# Patient Record
Sex: MALE | Race: WHITE | Employment: OTHER | URBAN - METROPOLITAN AREA
[De-identification: names, ages, dates, MRNs, and addresses within clinical notes are randomized per-mention and may not be internally consistent; named-entity substitution may affect disease eponyms.]

---

## 2020-01-26 ENCOUNTER — OFFICE VISIT (OUTPATIENT)
Dept: URGENT CARE | Facility: URGENT CARE | Age: 70
End: 2020-01-26
Payer: MEDICARE

## 2020-01-26 ENCOUNTER — ANCILLARY PROCEDURE (OUTPATIENT)
Dept: GENERAL RADIOLOGY | Facility: CLINIC | Age: 70
End: 2020-01-26
Attending: NURSE PRACTITIONER
Payer: MEDICARE

## 2020-01-26 VITALS
DIASTOLIC BLOOD PRESSURE: 82 MMHG | SYSTOLIC BLOOD PRESSURE: 144 MMHG | HEIGHT: 69 IN | RESPIRATION RATE: 20 BRPM | TEMPERATURE: 96.9 F | HEART RATE: 96 BPM | BODY MASS INDEX: 17.3 KG/M2 | OXYGEN SATURATION: 98 % | WEIGHT: 116.8 LBS

## 2020-01-26 DIAGNOSIS — R06.02 SOB (SHORTNESS OF BREATH): Primary | ICD-10-CM

## 2020-01-26 DIAGNOSIS — R68.89 INFLUENZA-LIKE SYMPTOMS: ICD-10-CM

## 2020-01-26 DIAGNOSIS — R06.02 SOB (SHORTNESS OF BREATH): ICD-10-CM

## 2020-01-26 LAB
FLUAV+FLUBV AG SPEC QL: NEGATIVE
FLUAV+FLUBV AG SPEC QL: NEGATIVE
SPECIMEN SOURCE: NORMAL

## 2020-01-26 PROCEDURE — 71046 X-RAY EXAM CHEST 2 VIEWS: CPT

## 2020-01-26 PROCEDURE — 87804 INFLUENZA ASSAY W/OPTIC: CPT | Performed by: NURSE PRACTITIONER

## 2020-01-26 PROCEDURE — 99203 OFFICE O/P NEW LOW 30 MIN: CPT | Mod: 25 | Performed by: NURSE PRACTITIONER

## 2020-01-26 RX ORDER — ASPIRIN 325 MG
325 TABLET ORAL
COMMUNITY

## 2020-01-26 RX ORDER — IPRATROPIUM BROMIDE AND ALBUTEROL SULFATE 2.5; .5 MG/3ML; MG/3ML
3 SOLUTION RESPIRATORY (INHALATION) ONCE
Status: COMPLETED | OUTPATIENT
Start: 2020-01-26 | End: 2020-01-26

## 2020-01-26 RX ADMIN — IPRATROPIUM BROMIDE AND ALBUTEROL SULFATE 3 ML: 2.5; .5 SOLUTION RESPIRATORY (INHALATION) at 17:28

## 2020-01-26 ASSESSMENT — ENCOUNTER SYMPTOMS
WHEEZING: 1
FATIGUE: 1
VOMITING: 0
SHORTNESS OF BREATH: 1
ACTIVITY CHANGE: 1
MYALGIAS: 1
APPETITE CHANGE: 1
COUGH: 1
CHILLS: 1
FEVER: 1
HEADACHES: 1
DIARRHEA: 0

## 2020-01-26 ASSESSMENT — MIFFLIN-ST. JEOR: SCORE: 1275.18

## 2020-01-26 NOTE — PROGRESS NOTES
"SUBJECTIVE:   Sudhakar Sahni is a 69 year old male presenting with a chief complaint of   Chief Complaint   Patient presents with     Fever     3 days fever, deep cough, body aches, chills and sweats, breathing is rattley, headache. Taking [place] cold and flu.     Cough       He is a new patient of [facility].    URI Adult    Onset of symptoms was 3 days ago.  Course of illness is worsening  Current and Associated symptoms: as noted above and ROS  Treatment measures tried include OTC Cough med.  Predisposing factors include HX of COPD and current smoker    Review of Systems   Constitutional: Positive for activity change, appetite change, chills, fatigue and fever.   Respiratory: Positive for cough, shortness of breath and wheezing.    Gastrointestinal: Negative for diarrhea and vomiting.   Musculoskeletal: Positive for myalgias.   Neurological: Positive for headaches.       Past Medical History:   Diagnosis Date     Circulatory system disorder     12/2012,right leg     Essential hypertension     No Comments Provided     Other specified chronic obstructive airways disease     No Comments Provided     Tobacco use disorder     No Comments Provided     Family History   Problem Relation Age of Onset     Colon Cancer Father 81        Cancer-colon     Diabetes Mother         Diabetes     Current Outpatient Medications   Medication Sig Dispense Refill     aspirin (ASA) 325 MG tablet Take 325 mg by mouth       Social History     Tobacco Use     Smoking status: Current Every Day Smoker     Packs/day: 1.00     Years: 50.00     Pack years: 50.00     Types: Cigarettes     Smokeless tobacco: Never Used   Substance Use Topics     Alcohol use: Yes     Comment: Alcoholic Drinks/day: Peppermint Schnapps in coffee. 1 per day.       OBJECTIVE  BP (!) 144/82 (BP Location: Right arm, Patient Position: Sitting, Cuff Size: Adult Regular)   Pulse 96   Temp 96.9  F (36.1  C) (Tympanic)   Resp 20   Ht 1.753 m (5' 9\")   Wt 53 kg (116 " lb 12.8 oz)   SpO2 98%   BMI 17.25 kg/m      Physical Exam  Constitutional:       Appearance: He is ill-appearing. He is not diaphoretic.   HENT:      Head: Normocephalic.      Right Ear: Tympanic membrane, ear canal and external ear normal. There is no impacted cerumen.      Left Ear: Tympanic membrane, ear canal and external ear normal. There is no impacted cerumen.      Nose: Congestion and rhinorrhea present.      Mouth/Throat:      Mouth: Mucous membranes are dry.      Pharynx: Posterior oropharyngeal erythema present.   Neck:      Musculoskeletal: Neck supple.   Cardiovascular:      Rate and Rhythm: Normal rate and regular rhythm.      Pulses: Normal pulses.      Heart sounds: Normal heart sounds. No murmur. No friction rub. No gallop.    Pulmonary:      Effort: Pulmonary effort is normal. No respiratory distress.      Breath sounds: No stridor. Wheezing and rhonchi present. No rales.      Comments: Diffuse wheezes, scattered rhonchi and diminished lower bases    POST NEB: No improvment of aeration, continued wheeze and rhonchi  Chest:      Chest wall: No tenderness.   Lymphadenopathy:      Cervical: No cervical adenopathy.   Skin:     General: Skin is warm.      Capillary Refill: Capillary refill takes less than 2 seconds.      Findings: No rash.   Neurological:      General: No focal deficit present.      Mental Status: He is alert and oriented to person, place, and time. Mental status is at baseline.   Psychiatric:         Mood and Affect: Mood normal.         Behavior: Behavior normal.         Labs:  Results for orders placed or performed in visit on 01/26/20 (from the past 24 hour(s))   Influenza A/B antigen   Result Value Ref Range    Influenza A/B Agn Specimen Nasopharyngeal     Influenza A Negative NEG^Negative    Influenza B Negative NEG^Negative   CXR:                                                                  IMPRESSION: PA and lateral views of the chest. Lungs are hyperinflated  consistent  with underlying COPD. No focal infiltrate or consolidation.  Heart is normal in size. No effusions are evident. No pneumothorax.      ASSESSMENT:    ICD-10-CM    1. SOB (shortness of breath) R06.02 ipratropium - albuterol 0.5 mg/2.5 mg/3 mL (DUONEB) neb solution 3 mL     XR Chest 2 Views     INHALATION/NEBULIZER TREATMENT, INITIAL   2. Influenza-like symptoms R68.89 Influenza A/B antigen        Medical Decision Making:    Differential Diagnosis:  URI Adult/Peds:  Bronchitis-viral, Influenza, Viral syndrome and Viral upper respiratory illness    Serious Comorbid Conditions:  Adult:  COPD and HTN    PLAN: Discussed with patient concerns of continued respiratory distress in the setting of COPD, with no improvement. Proceed to ER for further evaluation now. Declined medical transport. Will proceed to ER in [place] for further evaluation.     APRIL Khan, CNP    There are no Patient Instructions on file for this visit.

## 2020-01-26 NOTE — NURSING NOTE
Clinic Administered Medication Documentation    MEDICATION LIST: Inhalable/Nebs Medication Documentation    Patient was given Ipratropium-Albuterol Neb. Prior to medication administration, verified patients identity using patient s name and date of birth. Please see MAR and medication order for additional information.   E:6/21  [facility]

## 2022-12-01 ENCOUNTER — LAB REQUISITION (OUTPATIENT)
Dept: LAB | Facility: CLINIC | Age: 72
End: 2022-12-01

## 2022-12-01 DIAGNOSIS — A41.9 SEPSIS, UNSPECIFIED ORGANISM (H): ICD-10-CM

## 2022-12-02 LAB
CREAT SERPL-MCNC: 0.72 MG/DL (ref 0.66–1.25)
ERYTHROCYTE [DISTWIDTH] IN BLOOD BY AUTOMATED COUNT: 24.8 % (ref 10–15)
GFR SERPL CREATININE-BSD FRML MDRD: >90 ML/MIN/1.73M2
HCT VFR BLD AUTO: 29.7 % (ref 40–53)
HGB BLD-MCNC: 8.2 G/DL (ref 13.3–17.7)
MCH RBC QN AUTO: 21.1 PG (ref 26.5–33)
MCHC RBC AUTO-ENTMCNC: 27.6 G/DL (ref 31.5–36.5)
MCV RBC AUTO: 77 FL (ref 78–100)
PLATELET # BLD AUTO: 584 10E3/UL (ref 150–450)
POTASSIUM BLD-SCNC: 3.5 MMOL/L (ref 3.4–5.3)
RBC # BLD AUTO: 3.88 10E6/UL (ref 4.4–5.9)
WBC # BLD AUTO: 3.8 10E3/UL (ref 4–11)

## 2022-12-02 PROCEDURE — P9604 ONE-WAY ALLOW PRORATED TRIP: HCPCS | Performed by: NURSE PRACTITIONER

## 2022-12-02 PROCEDURE — 82565 ASSAY OF CREATININE: CPT | Performed by: NURSE PRACTITIONER

## 2022-12-02 PROCEDURE — 36415 COLL VENOUS BLD VENIPUNCTURE: CPT | Performed by: NURSE PRACTITIONER

## 2022-12-02 PROCEDURE — 85041 AUTOMATED RBC COUNT: CPT | Performed by: NURSE PRACTITIONER

## 2022-12-02 PROCEDURE — 84132 ASSAY OF SERUM POTASSIUM: CPT | Performed by: NURSE PRACTITIONER

## 2022-12-06 ENCOUNTER — DOCUMENTATION ONLY (OUTPATIENT)
Dept: OTHER | Facility: CLINIC | Age: 72
End: 2022-12-06

## 2025-02-27 ENCOUNTER — DOCUMENTATION ONLY (OUTPATIENT)
Dept: GERIATRICS | Facility: CLINIC | Age: 75
End: 2025-02-27
Payer: MEDICARE
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
## 2025-02-27 PROBLEM — R13.12 OROPHARYNGEAL DYSPHAGIA: Status: ACTIVE | Noted: 2024-05-29

## 2025-02-27 PROBLEM — K63.5 POLYP OF ASCENDING COLON: Status: ACTIVE | Noted: 2025-02-27

## 2025-02-27 PROBLEM — K55.069 OCCLUSION OF SUPERIOR MESENTERIC ARTERY: Status: ACTIVE | Noted: 2022-03-18

## 2025-02-27 PROBLEM — I70.229: Status: ACTIVE | Noted: 2024-02-07

## 2025-02-27 PROBLEM — R91.1 LUNG NODULE: Status: ACTIVE | Noted: 2025-02-22

## 2025-02-27 PROBLEM — I11.0 HYPERTENSIVE HEART DISEASE WITH HEART FAILURE (H): Status: ACTIVE | Noted: 2022-03-18

## 2025-02-27 PROBLEM — G25.81 RESTLESS LEGS SYNDROME: Status: ACTIVE | Noted: 2022-03-18

## 2025-02-27 PROBLEM — D72.829 LEUKOCYTOSIS: Status: ACTIVE | Noted: 2022-03-10

## 2025-02-27 PROBLEM — N17.9 AKI (ACUTE KIDNEY INJURY): Status: ACTIVE | Noted: 2025-02-22

## 2025-02-27 PROBLEM — R10.13 EPIGASTRIC ABDOMINAL PAIN: Status: ACTIVE | Noted: 2024-05-29

## 2025-02-27 PROBLEM — K44.9 HIATAL HERNIA: Status: ACTIVE | Noted: 2025-02-27

## 2025-02-27 PROBLEM — R55 SYNCOPE: Status: ACTIVE | Noted: 2022-11-24

## 2025-02-27 PROBLEM — A31.0 MAI (MYCOBACTERIUM AVIUM-INTRACELLULARE) (H): Status: ACTIVE | Noted: 2025-02-21

## 2025-02-27 PROBLEM — A41.9 SEVERE SEPSIS (H): Status: ACTIVE | Noted: 2022-11-23

## 2025-02-27 PROBLEM — I25.10 CAD IN NATIVE ARTERY: Status: ACTIVE | Noted: 2021-04-28

## 2025-02-27 PROBLEM — J96.21 ACUTE ON CHRONIC RESPIRATORY FAILURE WITH HYPOXIA (H): Status: ACTIVE | Noted: 2023-07-25

## 2025-02-27 PROBLEM — D50.9 IRON DEFICIENCY ANEMIA: Status: ACTIVE | Noted: 2022-11-28

## 2025-02-27 PROBLEM — K55.1: Status: ACTIVE | Noted: 2021-08-25

## 2025-02-27 PROBLEM — I95.9 HYPOTENSION: Status: ACTIVE | Noted: 2025-02-21

## 2025-02-27 PROBLEM — R52 UNCONTROLLED PAIN: Status: ACTIVE | Noted: 2022-03-10

## 2025-02-27 PROBLEM — R79.89 ELEVATED LFTS: Status: ACTIVE | Noted: 2025-02-21

## 2025-02-27 PROBLEM — E87.8 ELECTROLYTE ABNORMALITY: Status: ACTIVE | Noted: 2021-08-25

## 2025-02-27 PROBLEM — R73.03 PREDIABETES: Status: ACTIVE | Noted: 2022-03-12

## 2025-02-27 PROBLEM — R63.6 UNDERWEIGHT DUE TO INADEQUATE CALORIC INTAKE: Status: ACTIVE | Noted: 2021-08-26

## 2025-02-27 PROBLEM — I99.8 ANGIECTASIA: Status: ACTIVE | Noted: 2025-02-27

## 2025-02-27 PROBLEM — R09.02 HYPOXIA: Status: ACTIVE | Noted: 2022-03-11
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
## 2025-02-27 PROBLEM — M25.552 HIP PAIN, LEFT: Status: ACTIVE | Noted: 2022-03-18

## 2025-02-27 PROBLEM — R65.20 SEVERE SEPSIS (H): Status: ACTIVE | Noted: 2022-11-23

## 2025-02-27 PROBLEM — S72.002A CLOSED LEFT HIP FRACTURE (H): Status: ACTIVE | Noted: 2022-03-11

## 2025-02-27 PROBLEM — F33.9 DEPRESSION, RECURRENT: Status: ACTIVE | Noted: 2024-07-16

## 2025-02-27 PROBLEM — E43 SEVERE MALNUTRITION: Status: ACTIVE | Noted: 2021-08-26

## 2025-02-27 PROBLEM — R63.4 WEIGHT LOSS: Status: ACTIVE | Noted: 2025-02-27

## 2025-02-27 NOTE — PROGRESS NOTES
[facility] GERIATRICS  INITIAL VISIT NOTE      PRIMARY CARE PROVIDER AND CLINIC: Bob Bourne[address] [facility] [address]    [facility] Medical Record Number: [id number]  Place of Service where encounter took place: [facility] ([facility]) [[id number]]    Chief Complaint   Patient presents with    Hospital F/U     [facility] 2/22/2025 - 2/26/2025     HPI:    Sudhakar Sahni Jr. is a 74 year old (1950) male was admitted to the above facility from [facility]. Hospital stay 2/22/25 through 2/26/25 where they were admitted for NSTEMI. Now admitted to this facility for rehab, medical management, and nursing care.      History obtained from: facility chart records, facility staff, patient report, [facility] chart review, and Care Everywhere [facility] chart review.      Brief Hospital Course: PMH of CAD, PAD, COPD, current smoker, lung nodule who was admitted with NSTEMI. Underwent coronary angiogram, which showed multivessel disease. CV surgery consulted and was not a surgical candidate. JAYDE x2 placed to LAD, JAYDE x2 placed to circumflex. Started on ASA, Plavix and bisoprolol and atorvastatin increased. Had new onset Afib, converted back to NSR. Cardiology recommended outpatient Zio patch. Due to weakness, TCU recommended. When medically stable was discharged to [facility] for further rehab and medical management.       U Course: ***    CODE STATUS/ADVANCE DIRECTIVES: {CODE STATUS:993375}    ALLERGIES:  No Known Allergies    PAST MEDICAL HISTORY:   Past Medical History:   Diagnosis Date    Circulatory system disorder     12/2012,right leg    Essential hypertension     No Comments Provided    Other specified chronic obstructive airways disease     No Comments Provided    Tobacco use disorder     No Comments Provided     PAST SURGICAL HISTORY:   Past Surgical History:   Procedure Laterality Date    APPENDECTOMY OPEN      child    IR LUNG BIOPSY MEDIASTINUM RIGHT  6/12/2024    TONSILLECTOMY       child     FAMILY HISTORY:   Family History   Problem Relation Age of Onset    Colon Cancer Father 81        Cancer-colon    Diabetes Mother         Diabetes     Unable to review due to cognitive impairment***    SOCIAL HISTORY:   Patient's living condition: lives alone    MEDICATIONS  Post Discharge Medication Reconciliation Status: {ACO Med Rec (Provider):397877}.  Current Outpatient Medications   Medication Sig Dispense Refill    aspirin (ASA) 325 MG tablet Take 325 mg by mouth       ROS:  10 point ROS neg other than the symptoms noted above in the HPI.***  Unable to obtain due to cognitive impairment or aphasia  ROS    PHYSICAL EXAM:  There were no vitals taken for this visit.  Physical Exam     LABORATORY/IMAGING DATA:  Reviewed as per [facility] and/or [facility]    ASSESSMENT/PLAN:  {FGS DX INITIAL:816945}    Orders:   ***    {FGS TIME SPENT:863768}    Electronically signed by:  Afshan Green

## 2025-02-28 ENCOUNTER — TRANSITIONAL CARE UNIT VISIT (OUTPATIENT)
Dept: GERIATRICS | Facility: CLINIC | Age: 75
End: 2025-02-28
Payer: COMMERCIAL

## 2025-02-28 DIAGNOSIS — R53.81 PHYSICAL DECONDITIONING: ICD-10-CM

## 2025-02-28 DIAGNOSIS — E46 PROTEIN-CALORIE MALNUTRITION, UNSPECIFIED SEVERITY: ICD-10-CM

## 2025-02-28 DIAGNOSIS — F17.200 TOBACCO USE DISORDER: ICD-10-CM

## 2025-02-28 DIAGNOSIS — I21.4 NSTEMI (NON-ST ELEVATED MYOCARDIAL INFARCTION) (H): Primary | ICD-10-CM

## 2025-02-28 DIAGNOSIS — R91.8 PULMONARY NODULES: ICD-10-CM

## 2025-02-28 DIAGNOSIS — J44.9 CHRONIC OBSTRUCTIVE PULMONARY DISEASE, UNSPECIFIED COPD TYPE (H): ICD-10-CM

## 2025-02-28 DIAGNOSIS — I25.10 CORONARY ARTERY DISEASE INVOLVING NATIVE CORONARY ARTERY OF NATIVE HEART WITHOUT ANGINA PECTORIS: ICD-10-CM

## 2025-02-28 DIAGNOSIS — I73.9 PERIPHERAL ARTERIAL DISEASE: ICD-10-CM

## 2025-02-28 DIAGNOSIS — I10 PRIMARY HYPERTENSION: ICD-10-CM

## 2025-02-28 DIAGNOSIS — E78.5 HYPERLIPIDEMIA, UNSPECIFIED HYPERLIPIDEMIA TYPE: ICD-10-CM

## 2025-02-28 DIAGNOSIS — I48.91 ATRIAL FIBRILLATION, UNSPECIFIED TYPE (H): ICD-10-CM

## 2025-02-28 NOTE — LETTER
2/28/2025      Sudhakar Sahni Jr.  [address]  [place] [address]        [facility] GERIATRICS  INITIAL VISIT NOTE  February 27, 2025    PRIMARY CARE PROVIDER AND CLINIC: Bob Bourne [address] [facility] [address]    [facility] Medical Record Number: [id number]  Place of Service where encounter took place: [facility] ([facility] [[id number]]    Chief Complaint   Patient presents with    Hospital F/U     [facility] 2/22/2025 - 2/26/2025     HPI:    Sudhakar Sahni Jr. is a 74 year old (1950) male was admitted to the above facility from [facility]. Hospital stay 2/22/25 through 2/26/25 where they were admitted for ***. Now admitted to this facility for {FGS ADMISSION REASONS:483304}.      History obtained from: {FGS HPI:444819}.      Brief Hospital Course: ***    TCU Course: ***    CODE STATUS/ADVANCE DIRECTIVES: {CODE STATUS:816316}    ALLERGIES:  No Known Allergies    PAST MEDICAL HISTORY:   Past Medical History:   Diagnosis Date    Circulatory system disorder     12/2012,right leg    Essential hypertension     No Comments Provided    Other specified chronic obstructive airways disease     No Comments Provided    Tobacco use disorder     No Comments Provided     PAST SURGICAL HISTORY:   Past Surgical History:   Procedure Laterality Date    APPENDECTOMY OPEN      child    IR LUNG BIOPSY MEDIASTINUM RIGHT  6/12/2024    TONSILLECTOMY      child     FAMILY HISTORY:   Family History   Problem Relation Age of Onset    Colon Cancer Father 81        Cancer-colon    Diabetes Mother         Diabetes     Unable to review due to cognitive impairment***    SOCIAL HISTORY:   Patient's living condition: lives alone    MEDICATIONS  Post Discharge Medication Reconciliation Status: {ACO Med Rec (Provider):645318}.  Current Outpatient Medications   Medication Sig Dispense Refill    aspirin (ASA) 325 MG tablet Take 325 mg by mouth       ROS:  10 point ROS neg other than the symptoms noted  above in the HPI.***  Unable to obtain due to cognitive impairment or aphasia  ROS    PHYSICAL EXAM:  There were no vitals taken for this visit.  Physical Exam     LABORATORY/IMAGING DATA:  Reviewed as per [facility] and/or [facility]    ASSESSMENT/PLAN:  {FGS DX INITIAL:610207}    Orders:   ***    {FGS TIME SPENT:674513}    Electronically signed by:  Afshan Green       Sincerely,        APRIL Landers CNP    Electronically signed

## 2025-03-06 NOTE — PROGRESS NOTES
[facility] GERIATRICS  ACUTE/EPISODIC VISIT    [facility] Medical Record Number: [id number]  Place of Service where encounter took place: [facility] ([facility]) [[id number]]    Chief Complaint   Patient presents with    RECHECK     HPI:    Sudhakar Sahni Jr. is a 74 year old (1950), who is being seen today for an episodic care visit. HPI information obtained from: {FGS HPI:033704}.    Today's concern is:    ALLERGIES: No Known Allergies   MEDICATIONS:  Post Discharge Medication Reconciliation Status: {ACO Med Rec (Provider):842079}. ***    Current Outpatient Medications   Medication Sig Dispense Refill    acetaminophen (TYLENOL) 500 MG tablet Take 1,000 mg by mouth 3 times daily as needed for pain.      albuterol (PROAIR HFA/PROVENTIL HFA/VENTOLIN HFA) 108 (90 Base) MCG/ACT inhaler Inhale 2 puffs into the lungs every 4 hours as needed for shortness of breath, wheezing or cough.      albuterol (PROVENTIL) (2.5 MG/3ML) 0.083% neb solution Take 2.5 mg by nebulization every 6 hours as needed for shortness of breath, wheezing or cough.      aspirin (ASA) 81 MG chewable tablet Take 81 mg by mouth daily.      atorvastatin (LIPITOR) 80 MG tablet Take 80 mg by mouth at bedtime.      benzonatate (TESSALON) 100 MG capsule Take 200 mg by mouth 3 times daily as needed for cough.      bisoprolol (ZEBETA) 5 MG tablet Take 5 mg by mouth daily.      clopidogrel (PLAVIX) 75 MG tablet Take 75 mg by mouth every 24 hours.      ferrous gluconate (FERGON) 324 (38 Fe) MG tablet Take 324 mg by mouth daily (with breakfast).      Fluticasone-Umeclidin-Vilant (TRELEGY ELLIPTA) 100-62.5-25 MCG/ACT oral inhaler Inhale 1 puff into the lungs daily.      guaiFENesin (MUCINEX) 600 MG 12 hr tablet Take 1,200 mg by mouth 2 times daily.      multivitamin w/minerals (THERA-VIT-M) tablet Take 1 tablet by mouth daily.      nitroGLYcerin (NITROSTAT) 0.4 MG sublingual tablet Place 0.4 mg under the tongue every 5 minutes as needed for  chest pain. For chest pain place 1 tablet under the tongue every 5 minutes for 3 doses. If symptoms persist 5 minutes after 1st dose call 911.      pantoprazole (PROTONIX) 40 MG EC tablet Take 40 mg by mouth 2 times daily.      tamsulosin (FLOMAX) 0.4 MG capsule Take 0.8 mg by mouth at bedtime.       Medications reviewed:  Medications reconciled to facility chart and changes were made to reflect current medications as identified as above med list. Below are the changes that were made:   Medications stopped since last EPIC medication reconciliation:   There are no discontinued medications.    Medications started since last [facility] medication reconciliation:  No orders of the defined types were placed in this encounter.    ***    REVIEW OF SYSTEMS:  4 point ROS neg other than the symptoms noted above in the HPI.***  Unable to be obtained due to cognitive impairment or aphasia.   ROS    PHYSICAL EXAM:  There were no vitals taken for this visit.  Physical Exam    ASSESSMENT / PLAN:  {FGS DX:380189}    Orders:  ***    Electronically signed by:  Afshan Green***

## 2025-03-07 ENCOUNTER — TRANSITIONAL CARE UNIT VISIT (OUTPATIENT)
Dept: GERIATRICS | Facility: CLINIC | Age: 75
End: 2025-03-07
Payer: MEDICARE

## 2025-03-07 DIAGNOSIS — J44.9 CHRONIC OBSTRUCTIVE PULMONARY DISEASE, UNSPECIFIED COPD TYPE (H): ICD-10-CM

## 2025-03-07 DIAGNOSIS — I21.4 NSTEMI (NON-ST ELEVATED MYOCARDIAL INFARCTION) (H): Primary | ICD-10-CM

## 2025-03-07 DIAGNOSIS — E78.5 HYPERLIPIDEMIA, UNSPECIFIED HYPERLIPIDEMIA TYPE: ICD-10-CM

## 2025-03-07 DIAGNOSIS — I48.91 ATRIAL FIBRILLATION, UNSPECIFIED TYPE (H): ICD-10-CM

## 2025-03-07 DIAGNOSIS — I73.9 PERIPHERAL ARTERIAL DISEASE: ICD-10-CM

## 2025-03-07 DIAGNOSIS — I25.10 CORONARY ARTERY DISEASE INVOLVING NATIVE CORONARY ARTERY OF NATIVE HEART WITHOUT ANGINA PECTORIS: ICD-10-CM

## 2025-03-07 DIAGNOSIS — I10 PRIMARY HYPERTENSION: ICD-10-CM

## 2025-03-07 NOTE — LETTER
3/7/2025      Sudhakar Sahni Jr.  [address] [facility] [address]        [facility] GERIATRICS  ACUTE/EPISODIC VISIT    [facility] Medical Record Number: [id number]  Place of Service where encounter took place: [facility] ([facility]) [[id number]]    Chief Complaint   Patient presents with    RECHECK     HPI:    Sudhakar Sahni Jr. is a 74 year old (1950), who is being seen today for an episodic care visit. HPI information obtained from: {FGS HPI:592161}.    Today's concern is:    ALLERGIES: No Known Allergies   MEDICATIONS:  Post Discharge Medication Reconciliation Status: {ACO Med Rec (Provider):872260}. ***    Current Outpatient Medications   Medication Sig Dispense Refill    acetaminophen (TYLENOL) 500 MG tablet Take 1,000 mg by mouth 3 times daily as needed for pain.      albuterol (PROAIR HFA/PROVENTIL HFA/VENTOLIN HFA) 108 (90 Base) MCG/ACT inhaler Inhale 2 puffs into the lungs every 4 hours as needed for shortness of breath, wheezing or cough.      albuterol (PROVENTIL) (2.5 MG/3ML) 0.083% neb solution Take 2.5 mg by nebulization every 6 hours as needed for shortness of breath, wheezing or cough.      aspirin (ASA) 81 MG chewable tablet Take 81 mg by mouth daily.      atorvastatin (LIPITOR) 80 MG tablet Take 80 mg by mouth at bedtime.      benzonatate (TESSALON) 100 MG capsule Take 200 mg by mouth 3 times daily as needed for cough.      bisoprolol (ZEBETA) 5 MG tablet Take 5 mg by mouth daily.      clopidogrel (PLAVIX) 75 MG tablet Take 75 mg by mouth every 24 hours.      ferrous gluconate (FERGON) 324 (38 Fe) MG tablet Take 324 mg by mouth daily (with breakfast).      Fluticasone-Umeclidin-Vilant (TRELEGY ELLIPTA) 100-62.5-25 MCG/ACT oral inhaler Inhale 1 puff into the lungs daily.      guaiFENesin (MUCINEX) 600 MG 12 hr tablet Take 1,200 mg by mouth 2 times daily.      multivitamin w/minerals (THERA-VIT-M) tablet Take 1 tablet by mouth daily.      nitroGLYcerin (NITROSTAT) 0.4 MG  sublingual tablet Place 0.4 mg under the tongue every 5 minutes as needed for chest pain. For chest pain place 1 tablet under the tongue every 5 minutes for 3 doses. If symptoms persist 5 minutes after 1st dose call 911.      pantoprazole (PROTONIX) 40 MG EC tablet Take 40 mg by mouth 2 times daily.      tamsulosin (FLOMAX) 0.4 MG capsule Take 0.8 mg by mouth at bedtime.       Medications reviewed:  Medications reconciled to facility chart and changes were made to reflect current medications as identified as above med list. Below are the changes that were made:   Medications stopped since last EPIC medication reconciliation:   There are no discontinued medications.    Medications started since last [facility] medication reconciliation:  No orders of the defined types were placed in this encounter.    ***    REVIEW OF SYSTEMS:  4 point ROS neg other than the symptoms noted above in the HPI.***  Unable to be obtained due to cognitive impairment or aphasia.   ROS    PHYSICAL EXAM:  There were no vitals taken for this visit.  Physical Exam    ASSESSMENT / PLAN:  {FGS DX:380429}    Orders:  ***    Electronically signed by:  Afshan Green***      Sincerely,        APRIL Landers CNP    Electronically signed

## 2025-03-11 ENCOUNTER — DISCHARGE SUMMARY NURSING HOME (OUTPATIENT)
Dept: GERIATRICS | Facility: CLINIC | Age: 75
End: 2025-03-11
Payer: MEDICARE

## 2025-03-11 VITALS
RESPIRATION RATE: 19 BRPM | DIASTOLIC BLOOD PRESSURE: 70 MMHG | SYSTOLIC BLOOD PRESSURE: 132 MMHG | HEIGHT: 68 IN | WEIGHT: 111 LBS | OXYGEN SATURATION: 96 % | HEART RATE: 53 BPM | TEMPERATURE: 98.3 F | BODY MASS INDEX: 16.82 KG/M2

## 2025-03-11 DIAGNOSIS — I25.10 CORONARY ARTERY DISEASE INVOLVING NATIVE CORONARY ARTERY OF NATIVE HEART WITHOUT ANGINA PECTORIS: ICD-10-CM

## 2025-03-11 DIAGNOSIS — R91.8 PULMONARY NODULES: ICD-10-CM

## 2025-03-11 DIAGNOSIS — I73.9 PERIPHERAL ARTERIAL DISEASE: ICD-10-CM

## 2025-03-11 DIAGNOSIS — I48.91 ATRIAL FIBRILLATION, UNSPECIFIED TYPE (H): ICD-10-CM

## 2025-03-11 DIAGNOSIS — F17.200 TOBACCO USE DISORDER: ICD-10-CM

## 2025-03-11 DIAGNOSIS — R53.81 PHYSICAL DECONDITIONING: ICD-10-CM

## 2025-03-11 DIAGNOSIS — E78.5 HYPERLIPIDEMIA, UNSPECIFIED HYPERLIPIDEMIA TYPE: ICD-10-CM

## 2025-03-11 DIAGNOSIS — I21.4 NSTEMI (NON-ST ELEVATED MYOCARDIAL INFARCTION) (H): Primary | ICD-10-CM

## 2025-03-11 DIAGNOSIS — M79.602 PAIN OF LEFT UPPER EXTREMITY: ICD-10-CM

## 2025-03-11 DIAGNOSIS — I10 PRIMARY HYPERTENSION: ICD-10-CM

## 2025-03-11 DIAGNOSIS — E46 PROTEIN-CALORIE MALNUTRITION, UNSPECIFIED SEVERITY: ICD-10-CM

## 2025-03-11 DIAGNOSIS — J44.9 CHRONIC OBSTRUCTIVE PULMONARY DISEASE, UNSPECIFIED COPD TYPE (H): ICD-10-CM

## 2025-03-11 PROCEDURE — 99316 NF DSCHRG MGMT 30 MIN+: CPT | Performed by: NURSE PRACTITIONER

## 2025-03-11 RX ORDER — NITROGLYCERIN 0.4 MG/1
0.4 TABLET SUBLINGUAL EVERY 5 MIN PRN
Qty: 25 TABLET | Refills: 0 | Status: SHIPPED | OUTPATIENT
Start: 2025-03-11

## 2025-03-11 RX ORDER — ATORVASTATIN CALCIUM 80 MG/1
80 TABLET, FILM COATED ORAL AT BEDTIME
Qty: 30 TABLET | Refills: 0 | Status: SHIPPED | OUTPATIENT
Start: 2025-03-11

## 2025-03-11 RX ORDER — BISOPROLOL FUMARATE 5 MG/1
5 TABLET, FILM COATED ORAL DAILY
Qty: 30 TABLET | Refills: 0 | Status: SHIPPED | OUTPATIENT
Start: 2025-03-11

## 2025-03-11 NOTE — LETTER
3/11/2025      Sudhakar Sahni Jr.  [address] [facility] [address]        [facility] GERIATRICS DISCHARGE SUMMARY  Patient Name: Sudhakar Sahni Jr.  YOB: 1950  [place] Medical Record Number: [id number]  Place of Service Where Encounter Took Place: [facility] ([facility]) [[id number]]    PRIMARY CARE PROVIDER AND CLINIC RESPONSIBLE AFTER TRANSFER: AMBER HALE MD, [address] [facility] [address]; Non-G Provider     Transferring providers: APRIL Bond CNP; Isis Coronado MD  Recent Hospitalization/ED: [facility] stay 2/22/25 to 2/26/25.  Date of SNF Admission: February 26, 2025  Date of SNF (anticipated) Discharge: March 14, 2025  Discharged to: previous independent home  Cognitive Scores: SLUMS: 21/30  Physical Function: Ambulating 230 ft with supervision  DME: Walker    CODE STATUS/ADVANCE DIRECTIVES DISCUSSION: Full Code   ALLERGIES: Patient has no known allergies.    NURSING FACILITY COURSE:  Medication Changes/Rationale:   Added PRN melatonin for sleep    Summary of nursing facility stay:   Brief Hospital Course: PMH of CAD, PAD, COPD, current smoker, lung nodule who was admitted with NSTEMI. Underwent coronary angiogram, which showed multivessel disease. CV surgery consulted and was not a surgical candidate. JAYDE x2 placed to LAD, JAYDE x2 placed to circumflex. Started on ASA, Plavix and bisoprolol and atorvastatin increased. Had new onset Afib, converted back to NSR. Cardiology recommended outpatient Zio patch. Due to weakness, TCU recommended. When medically stable was discharged to [facility] for further rehab and medical management.     NSTEMI (non-ST elevated myocardial infarction) (H)  Coronary artery disease involving native coronary artery of native heart without angina pectoris  Primary hypertension  Hyperlipidemia, unspecified hyperlipidemia type  S/p JAYDE x2 to mid LAD, DESx1 to proximal circumflex to OM1 and DESx1 to mid circumflex. New on  ASA, Bisoprolol. Atorvastatin increased. -130, weight ~112lbs Intermittent mild chest pain initially in TCU which he did not report to staff, but none recently.   - continue ASA, Plavix, atorvastatin, bisoprolol  - daily weights.  - follow-up with cardiology  - nitroGLYcerin (NITROSTAT) 0.4 MG sublingual tablet; Place 1 tablet (0.4 mg) under the tongue every 5 minutes as needed for chest pain. For chest pain place 1 tablet under the tongue every 5 minutes for 3 doses. If symptoms persist 5 minutes after 1st dose call 911.    Peripheral arterial disease  No concerns in TCU,  - continue ASA, plavix, statin    Chronic obstructive pulmonary disease, unspecified COPD type (H)  Appears compensated, on RA. Has chronic productive cough  - continue Trelegy, albuterol PRN, nebs PRN, Tessalon PRN, Mucinex BID     Atrial fibrillation, unspecified type (H)  Converted to NSR while IP HR 53-80 in TCU, Asymptomatic  - Zio patch in place  - continue bisoprolol  - follow-up with cardiology     Pain of left upper extremity  Intermittent, happens while sleeping, arm goes numb, then gets sharp pains the resolve after about 30 seconds and numbness goes away. Feels is it getting better.   - follow-up with PCP    Tobacco use disorder  Not interested in quitting at this time    Pulmonary nodules  Follow-up with pulmonology for routine CT scan    Physical deconditioning  Made progress with therapy in TCU  - home care referral    Protein-calorie malnutrition, unspecified severity  BMI 17.8, appetite fair in TCU, was followed by dietician  - high calorie supplements per patient preference at home       Discharge Medications:  MED REC REQUIRED  Post Medication Reconciliation Status: medication reconcilation previously completed during another office visit    Current Outpatient Medications   Medication Sig Dispense Refill     acetaminophen (TYLENOL) 500 MG tablet Take 1,000 mg by mouth 3 times daily as needed for pain.       albuterol  (PROAIR HFA/PROVENTIL HFA/VENTOLIN HFA) 108 (90 Base) MCG/ACT inhaler Inhale 2 puffs into the lungs every 4 hours as needed for shortness of breath, wheezing or cough.       albuterol (PROVENTIL) (2.5 MG/3ML) 0.083% neb solution Take 2.5 mg by nebulization every 6 hours as needed for shortness of breath, wheezing or cough.       aspirin (ASA) 81 MG chewable tablet Take 81 mg by mouth daily.       atorvastatin (LIPITOR) 80 MG tablet Take 80 mg by mouth at bedtime.       benzonatate (TESSALON) 100 MG capsule Take 200 mg by mouth 3 times daily as needed for cough.       bisoprolol (ZEBETA) 5 MG tablet Take 5 mg by mouth daily.       clopidogrel (PLAVIX) 75 MG tablet Take 75 mg by mouth every 24 hours.       ferrous gluconate (FERGON) 324 (38 Fe) MG tablet Take 324 mg by mouth daily (with breakfast).       Fluticasone-Umeclidin-Vilant (TRELEGY ELLIPTA) 100-62.5-25 MCG/ACT oral inhaler Inhale 1 puff into the lungs daily.       guaiFENesin (MUCINEX) 600 MG 12 hr tablet Take 1,200 mg by mouth 2 times daily.       multivitamin w/minerals (THERA-VIT-M) tablet Take 1 tablet by mouth daily.       nitroGLYcerin (NITROSTAT) 0.4 MG sublingual tablet Place 0.4 mg under the tongue every 5 minutes as needed for chest pain. For chest pain place 1 tablet under the tongue every 5 minutes for 3 doses. If symptoms persist 5 minutes after 1st dose call 911.       pantoprazole (PROTONIX) 40 MG EC tablet Take 40 mg by mouth 2 times daily.       tamsulosin (FLOMAX) 0.4 MG capsule Take 0.8 mg by mouth at bedtime.       Controlled medications:   not applicable/none     Past Medical History:   Past Medical History:   Diagnosis Date     Circulatory system disorder     12/2012,right leg     Essential hypertension     No Comments Provided     Other specified chronic obstructive airways disease     No Comments Provided     Tobacco use disorder     No Comments Provided     Physical Exam:   Vitals: /70   Pulse 53   Temp 98.3  F (36.8  C)    "Resp 19   Ht 1.727 m (5' 8\")   Wt 50.3 kg (111 lb)   SpO2 96%   BMI 16.88 kg/m    BMI: Body mass index is 16.88 kg/m .  GENERAL APPEARANCE:  Alert, in no distress, cooperative, thin, frail   RESP:  lungs clear but diminished to auscultation , no respiratory distress,   CV:  regular rate and rhythm, no murmur, rub, or gallop, no edema  ABDOMEN:  bowel sounds normal,   M/S:   no gross joint deformities   NEURO:   Cn 2-12 grossly intact,   PSYCH:  oriented X 3, affect and mood normal       SNF Labs: Recent labs in [facility] reviewed by me today.     DISCHARGE PLAN:  Follow up labs: No labs orders/due  Medical Follow Up:   Follow up with primary care provider in 2 weeks  Follow up with specialist cardiolgoy   [facility] scheduled appointments: None.   Discharge Services: Home Care: Physical Therapy, Occupational Therapy, Registered Nurse, Home Health Aide. From: [employer].  Discharge Instructions Verbalized to Patient at Discharge:   Nitroglycerin PRN for chest pain     TOTAL DISCHARGE TIME: Greater than 30 minutes  Electronically signed by:  APRIL Landers CNP    Documentation of Face to Face and Certification for Home Health Services    I certify that services are/were furnished while this patient was under the care of a physician and that a physician or an allowed non-physician practitioner (NPP), had a face-to-face encounter that meets the physician face-to-face encounter requirements. The encounter was in whole, or in part, related to the primary reason for home health. The patient is confined to his/her home and needs intermittent skilled nursing, physical therapy, speech-language pathology, or the continued need for occupational therapy. A plan of care has been established by a physician and is periodically reviewed by a physician.  Date of Face-to-Face Encounter: 3/11/2025.    I certify that, based on my findings, the following services are medically necessary home health services: Nursing, " Occupational Therapy, Physical Therapy, and Home Health Aide .    My clinical findings support the need for the above skilled services because: Leaving home is medically contraindicated for the following reason(s): Dyspnea on exertion that makes it so they cannot leave their home for needed services without clinical deterioration.. and Requires assistance of another person or specialized equipment to access medical services because patient: Is unable to walk greater than 200 feet without rest...    Patient to re-establish plan of care with their PCP within 7-10 days after leaving the facility to reestablish care.  Medicare certified PECOS provider: APRIL Landers CNP  Date: March 11, 2025    Dr.Yasser Cliff MD, signing F2F and only signing for initial order. Please send all follow up questions and concerns or needed follow up signatures to the PCP, who Sarah has on file as:  Bob Bourne.      Sincerely,        APRIL Landers CNP    Electronically signed

## 2025-03-11 NOTE — PROGRESS NOTES
[facility] GERIATRICS DISCHARGE SUMMARY  Patient Name: Sudhakar Sahni Jr.  YOB: 1950  [place] Medical Record Number: [id number]  Place of Service Where Encounter Took Place: [facility] ([facility]) [[id number]]    PRIMARY CARE PROVIDER AND CLINIC RESPONSIBLE AFTER TRANSFER: AMBER HALE MD, [address] [facility] [address]; Non-G Provider     Transferring providers: APRIL Bond CNP; Isis Coronado MD  Recent Hospitalization/ED: [facility] stay 2/22/25 to 2/26/25.  Date of SNF Admission: February 26, 2025  Date of SNF (anticipated) Discharge: March 14, 2025  Discharged to: previous independent home  Cognitive Scores: SLUMS: 21/30  Physical Function: Ambulating 230 ft with supervision  DME: Walker    CODE STATUS/ADVANCE DIRECTIVES DISCUSSION: Full Code   ALLERGIES: Patient has no known allergies.    NURSING FACILITY COURSE:  Medication Changes/Rationale:   Added PRN melatonin for sleep    Summary of nursing facility stay:   Brief Hospital Course: PMH of CAD, PAD, COPD, current smoker, lung nodule who was admitted with NSTEMI. Underwent coronary angiogram, which showed multivessel disease. CV surgery consulted and was not a surgical candidate. JAYDE x2 placed to LAD, JAYDE x2 placed to circumflex. Started on ASA, Plavix and bisoprolol and atorvastatin increased. Had new onset Afib, converted back to NSR. Cardiology recommended outpatient Zio patch. Due to weakness, TCU recommended. When medically stable was discharged to [facility] for further rehab and medical management.     NSTEMI (non-ST elevated myocardial infarction) (H)  Coronary artery disease involving native coronary artery of native heart without angina pectoris  Primary hypertension  Hyperlipidemia, unspecified hyperlipidemia type  S/p JAYDE x2 to mid LAD, DESx1 to proximal circumflex to OM1 and DESx1 to mid circumflex. New on ASA, Bisoprolol. Atorvastatin increased. -130, weight ~112lbs Intermittent  mild chest pain initially in TCU which he did not report to staff, but none recently.   - continue ASA, Plavix, atorvastatin, bisoprolol  - daily weights.  - follow-up with cardiology  - nitroGLYcerin (NITROSTAT) 0.4 MG sublingual tablet; Place 1 tablet (0.4 mg) under the tongue every 5 minutes as needed for chest pain. For chest pain place 1 tablet under the tongue every 5 minutes for 3 doses. If symptoms persist 5 minutes after 1st dose call 911.    Peripheral arterial disease  No concerns in TCU,  - continue ASA, plavix, statin    Chronic obstructive pulmonary disease, unspecified COPD type (H)  Appears compensated, on RA. Has chronic productive cough  - continue Trelegy, albuterol PRN, nebs PRN, Tessalon PRN, Mucinex BID     Atrial fibrillation, unspecified type (H)  Converted to NSR while IP HR 53-80 in TCU, Asymptomatic  - Zio patch in place  - continue bisoprolol  - follow-up with cardiology     Pain of left upper extremity  Intermittent, happens while sleeping, arm goes numb, then gets sharp pains the resolve after about 30 seconds and numbness goes away. Feels is it getting better.   - follow-up with PCP    Tobacco use disorder  Not interested in quitting at this time    Pulmonary nodules  Follow-up with pulmonology for routine CT scan    Physical deconditioning  Made progress with therapy in TCU  - home care referral    Protein-calorie malnutrition, unspecified severity  BMI 17.8, appetite fair in TCU, was followed by dietician  - high calorie supplements per patient preference at home       Discharge Medications:  MED REC REQUIRED  Post Medication Reconciliation Status: medication reconcilation previously completed during another office visit    Current Outpatient Medications   Medication Sig Dispense Refill    acetaminophen (TYLENOL) 500 MG tablet Take 1,000 mg by mouth 3 times daily as needed for pain.      albuterol (PROAIR HFA/PROVENTIL HFA/VENTOLIN HFA) 108 (90 Base) MCG/ACT inhaler Inhale 2 puffs  "into the lungs every 4 hours as needed for shortness of breath, wheezing or cough.      albuterol (PROVENTIL) (2.5 MG/3ML) 0.083% neb solution Take 2.5 mg by nebulization every 6 hours as needed for shortness of breath, wheezing or cough.      aspirin (ASA) 81 MG chewable tablet Take 81 mg by mouth daily.      atorvastatin (LIPITOR) 80 MG tablet Take 80 mg by mouth at bedtime.      benzonatate (TESSALON) 100 MG capsule Take 200 mg by mouth 3 times daily as needed for cough.      bisoprolol (ZEBETA) 5 MG tablet Take 5 mg by mouth daily.      clopidogrel (PLAVIX) 75 MG tablet Take 75 mg by mouth every 24 hours.      ferrous gluconate (FERGON) 324 (38 Fe) MG tablet Take 324 mg by mouth daily (with breakfast).      Fluticasone-Umeclidin-Vilant (TRELEGY ELLIPTA) 100-62.5-25 MCG/ACT oral inhaler Inhale 1 puff into the lungs daily.      guaiFENesin (MUCINEX) 600 MG 12 hr tablet Take 1,200 mg by mouth 2 times daily.      multivitamin w/minerals (THERA-VIT-M) tablet Take 1 tablet by mouth daily.      nitroGLYcerin (NITROSTAT) 0.4 MG sublingual tablet Place 0.4 mg under the tongue every 5 minutes as needed for chest pain. For chest pain place 1 tablet under the tongue every 5 minutes for 3 doses. If symptoms persist 5 minutes after 1st dose call 911.      pantoprazole (PROTONIX) 40 MG EC tablet Take 40 mg by mouth 2 times daily.      tamsulosin (FLOMAX) 0.4 MG capsule Take 0.8 mg by mouth at bedtime.       Controlled medications:   not applicable/none     Past Medical History:   Past Medical History:   Diagnosis Date    Circulatory system disorder     12/2012,right leg    Essential hypertension     No Comments Provided    Other specified chronic obstructive airways disease     No Comments Provided    Tobacco use disorder     No Comments Provided     Physical Exam:   Vitals: /70   Pulse 53   Temp 98.3  F (36.8  C)   Resp 19   Ht 1.727 m (5' 8\")   Wt 50.3 kg (111 lb)   SpO2 96%   BMI 16.88 kg/m    BMI: Body mass " index is 16.88 kg/m .  GENERAL APPEARANCE:  Alert, in no distress, cooperative, thin, frail   RESP:  lungs clear but diminished to auscultation , no respiratory distress,   CV:  regular rate and rhythm, no murmur, rub, or gallop, no edema  ABDOMEN:  bowel sounds normal,   M/S:   no gross joint deformities   NEURO:   Cn 2-12 grossly intact,   PSYCH:  oriented X 3, affect and mood normal       SNF Labs: Recent labs in [facility] reviewed by me today.     DISCHARGE PLAN:  Follow up labs: No labs orders/due  Medical Follow Up:   Follow up with primary care provider in 2 weeks  Follow up with specialist cardiolgoy   [facility] scheduled appointments: None.   Discharge Services: Home Care: Physical Therapy, Occupational Therapy, Registered Nurse, Home Health Aide. From: [employer].  Discharge Instructions Verbalized to Patient at Discharge:   Nitroglycerin PRN for chest pain     TOTAL DISCHARGE TIME: Greater than 30 minutes  Electronically signed by:  APRIL Landers CNP    Documentation of Face to Face and Certification for Home Health Services    I certify that services are/were furnished while this patient was under the care of a physician and that a physician or an allowed non-physician practitioner (NPP), had a face-to-face encounter that meets the physician face-to-face encounter requirements. The encounter was in whole, or in part, related to the primary reason for home health. The patient is confined to his/her home and needs intermittent skilled nursing, physical therapy, speech-language pathology, or the continued need for occupational therapy. A plan of care has been established by a physician and is periodically reviewed by a physician.  Date of Face-to-Face Encounter: 3/11/2025.    I certify that, based on my findings, the following services are medically necessary home health services: Nursing, Occupational Therapy, Physical Therapy, and Home Health Aide .    My clinical findings support the  need for the above skilled services because: Leaving home is medically contraindicated for the following reason(s): Dyspnea on exertion that makes it so they cannot leave their home for needed services without clinical deterioration.. and Requires assistance of another person or specialized equipment to access medical services because patient: Is unable to walk greater than 200 feet without rest...    Patient to re-establish plan of care with their PCP within 7-10 days after leaving the facility to reestablish care.  Medicare certified PECOS provider: APRIL Landers CNP  Date: March 11, 2025    Dr.Yasser Cliff MD, signing F2F and only signing for initial order. Please send all follow up questions and concerns or needed follow up signatures to the PCP, who Sarah has on file as:  Bob Bourne.